# Patient Record
Sex: MALE | URBAN - METROPOLITAN AREA
[De-identification: names, ages, dates, MRNs, and addresses within clinical notes are randomized per-mention and may not be internally consistent; named-entity substitution may affect disease eponyms.]

---

## 2018-11-01 ENCOUNTER — OUTPATIENT (OUTPATIENT)
Dept: OUTPATIENT SERVICES | Facility: HOSPITAL | Age: 33
LOS: 1 days | End: 2018-11-01
Payer: MEDICAID

## 2018-11-01 PROCEDURE — G9001: CPT

## 2018-11-27 DIAGNOSIS — Z71.89 OTHER SPECIFIED COUNSELING: ICD-10-CM

## 2022-03-24 PROBLEM — Z00.00 ENCOUNTER FOR PREVENTIVE HEALTH EXAMINATION: Status: ACTIVE | Noted: 2022-03-24

## 2022-03-28 ENCOUNTER — APPOINTMENT (OUTPATIENT)
Dept: PULMONOLOGY | Facility: CLINIC | Age: 37
End: 2022-03-28

## 2022-04-05 ENCOUNTER — APPOINTMENT (OUTPATIENT)
Dept: PULMONOLOGY | Facility: CLINIC | Age: 37
End: 2022-04-05

## 2022-04-20 ENCOUNTER — APPOINTMENT (OUTPATIENT)
Dept: PULMONOLOGY | Facility: CLINIC | Age: 37
End: 2022-04-20
Payer: SELF-PAY

## 2022-04-20 VITALS
SYSTOLIC BLOOD PRESSURE: 136 MMHG | HEIGHT: 66 IN | OXYGEN SATURATION: 97 % | WEIGHT: 170 LBS | RESPIRATION RATE: 16 BRPM | HEART RATE: 82 BPM | BODY MASS INDEX: 27.32 KG/M2 | DIASTOLIC BLOOD PRESSURE: 90 MMHG | TEMPERATURE: 97.2 F

## 2022-04-20 DIAGNOSIS — Z87.891 PERSONAL HISTORY OF NICOTINE DEPENDENCE: ICD-10-CM

## 2022-04-20 DIAGNOSIS — Z78.9 OTHER SPECIFIED HEALTH STATUS: ICD-10-CM

## 2022-04-20 DIAGNOSIS — J18.9 PNEUMONIA, UNSPECIFIED ORGANISM: ICD-10-CM

## 2022-04-20 DIAGNOSIS — R93.89 ABNORMAL FINDINGS ON DIAGNOSTIC IMAGING OF OTHER SPECIFIED BODY STRUCTURES: ICD-10-CM

## 2022-04-20 PROCEDURE — 94729 DIFFUSING CAPACITY: CPT

## 2022-04-20 PROCEDURE — 99204 OFFICE O/P NEW MOD 45 MIN: CPT | Mod: 25

## 2022-04-20 PROCEDURE — ZZZZZ: CPT

## 2022-04-20 PROCEDURE — 94010 BREATHING CAPACITY TEST: CPT

## 2022-04-20 PROCEDURE — 94727 GAS DIL/WSHOT DETER LNG VOL: CPT

## 2022-04-20 PROCEDURE — 94618 PULMONARY STRESS TESTING: CPT

## 2022-04-20 NOTE — HISTORY OF PRESENT ILLNESS
[Former] : former [< 20 pack-years] : < 20 pack-years [Never] : never [TextBox_4] : Mr. Gan is a 36 year old, former smoking (quit 4 weeks ago), male.  He denies past medical history. He presents for an initial visit post hospitalization at [facility] [place] for Bacterial Pneumonia on 3/22-4/4/22. \par \par His chief concern is establishing care with pulmonologist post hospitalization. \par \par Patient states he was febrile the week prior to hospitalization and was evaluated and had normal CXR (results seen below).  He notes fever and SOB persisted, which is why he then reported to the hospital. Upon admission patient notes he had right sided chest tube placed and was placed along with use of supplemental O2 (no records available). He notes he 75% better since discharge with some residual right chest tightness and fatigue. \par \par He states he is able to tolerate activity with out feeling SOB. \par He notes Sunday he awoke in a cold sweat, but felt ok the rest of the day and has had no issues since. \par \par PCP: Dr. Marlin Wright\par \par He denies fever/chills, decreased appetite, SOB@ Rest or exertion, cough, wheezing, or any other issues at this time.  [TextBox_11] : 0.4  [TextBox_13] : 20 [YearQuit] : 2022

## 2022-04-20 NOTE — PROCEDURE
[FreeTextEntry1] : PFT's performed in office show mild obstructive lung defect, mild restrictive lung defect, diffusion capacity WNL. \par FEV1: 76% \par FEV1/FVC Ratio: 71% \par BCK47-11%: 47% \par DLCO: 88% \par \par 6MWT Performed in office WNL. \par RA SPO2 @ REST: 96% \par RA SPO2 @ EXERTION: 95% \par

## 2022-04-20 NOTE — REVIEW OF SYSTEMS
[Chest Tightness] : chest tightness [Negative] : Endocrine [Cough] : no cough [Sputum] : no sputum [Dyspnea] : no dyspnea [Wheezing] : no wheezing [SOB on Exertion] : no sob on exertion

## 2022-04-20 NOTE — ASSESSMENT
[FreeTextEntry1] : Mr. Gan is a 36 year old, former smoking (quit 4 weeks ago), male.  He denies past medical history. He presents for an initial visit post hospitalization at [facility] [place] for Bacterial Pneumonia on 3/22-4/4/22. His chief concern is establishing care with pulmonologist post hospitalization. \par \par 1. Abnormal CXR w/ recent right lung PNA:\par - Add Albuterol HFA 2 puffs Q4-6H PRN (pt notes he has at home).\par - Next CXR to be performed 5/8/22 to evaluate for resolution of abnormal findings. \par \par Patient to call with further questions and concerns.\par Patient verbalizes understanding of care plan and is agreeable.\par \par

## 2022-04-20 NOTE — DISCUSSION/SUMMARY
[FreeTextEntry1] : 3/15/22 CXR FROM [facility] IMPRESSION: Normal chest radiograph. \par \par 4/8/22 CXR FROM [facility] IMPRESSION: There is mild elevation of the right hemidiaphragm with blunting of the right costophrenic angle and mild pleural thickening along the right lateral chest wall. Bilateral decubitus views demonstrate no freely layering pleural fluid in the right lung.

## 2022-06-02 ENCOUNTER — APPOINTMENT (OUTPATIENT)
Dept: PULMONOLOGY | Facility: CLINIC | Age: 37
End: 2022-06-02

## 2022-06-21 ENCOUNTER — EMERGENCY (EMERGENCY)
Facility: HOSPITAL | Age: 37
LOS: 1 days | Discharge: HOME | End: 2022-06-21
Admitting: EMERGENCY MEDICINE
Payer: MEDICAID

## 2022-06-21 DIAGNOSIS — F41.9 ANXIETY DISORDER, UNSPECIFIED: ICD-10-CM

## 2022-06-21 DIAGNOSIS — R07.89 OTHER CHEST PAIN: ICD-10-CM

## 2022-06-21 PROCEDURE — 71045 X-RAY EXAM CHEST 1 VIEW: CPT | Mod: 26

## 2022-06-21 PROCEDURE — 99285 EMERGENCY DEPT VISIT HI MDM: CPT

## 2022-06-21 PROCEDURE — 93010 ELECTROCARDIOGRAM REPORT: CPT | Mod: 76
